# Patient Record
Sex: FEMALE | Race: WHITE | NOT HISPANIC OR LATINO | Employment: FULL TIME | URBAN - METROPOLITAN AREA
[De-identification: names, ages, dates, MRNs, and addresses within clinical notes are randomized per-mention and may not be internally consistent; named-entity substitution may affect disease eponyms.]

---

## 2018-09-03 ENCOUNTER — HISTORICAL (OUTPATIENT)
Dept: URGENT CARE | Facility: CLINIC | Age: 38
End: 2018-09-03

## 2019-12-13 ENCOUNTER — HISTORICAL (OUTPATIENT)
Dept: URGENT CARE | Facility: CLINIC | Age: 39
End: 2019-12-13

## 2022-04-07 ENCOUNTER — HISTORICAL (OUTPATIENT)
Dept: ADMINISTRATIVE | Facility: HOSPITAL | Age: 42
End: 2022-04-07

## 2022-04-24 VITALS
BODY MASS INDEX: 16.9 KG/M2 | WEIGHT: 99 LBS | DIASTOLIC BLOOD PRESSURE: 75 MMHG | HEIGHT: 64 IN | SYSTOLIC BLOOD PRESSURE: 124 MMHG | OXYGEN SATURATION: 100 %

## 2024-01-20 ENCOUNTER — OFFICE VISIT (OUTPATIENT)
Dept: URGENT CARE | Facility: CLINIC | Age: 44
End: 2024-01-20
Payer: COMMERCIAL

## 2024-01-20 VITALS
OXYGEN SATURATION: 98 % | BODY MASS INDEX: 22.2 KG/M2 | RESPIRATION RATE: 18 BRPM | WEIGHT: 130 LBS | HEART RATE: 99 BPM | SYSTOLIC BLOOD PRESSURE: 123 MMHG | TEMPERATURE: 98 F | DIASTOLIC BLOOD PRESSURE: 82 MMHG | HEIGHT: 64 IN

## 2024-01-20 DIAGNOSIS — R05.9 COUGH, UNSPECIFIED TYPE: ICD-10-CM

## 2024-01-20 DIAGNOSIS — J32.9 SINUSITIS, UNSPECIFIED CHRONICITY, UNSPECIFIED LOCATION: Primary | ICD-10-CM

## 2024-01-20 PROCEDURE — 99203 OFFICE O/P NEW LOW 30 MIN: CPT | Mod: ,,,

## 2024-01-20 RX ORDER — GABAPENTIN 100 MG/1
100 CAPSULE ORAL
COMMUNITY
Start: 2024-01-04 | End: 2024-02-03

## 2024-01-20 RX ORDER — DOXYCYCLINE 100 MG/1
100 CAPSULE ORAL EVERY 12 HOURS
Qty: 14 CAPSULE | Refills: 0 | Status: SHIPPED | OUTPATIENT
Start: 2024-01-20 | End: 2024-01-27

## 2024-01-20 NOTE — PROGRESS NOTES
"Subjective:      Patient ID: Karen Harper is a 43 y.o. female.    Vitals:  height is 5' 4" (1.626 m) and weight is 59 kg (130 lb). Her temperature is 98 °F (36.7 °C). Her blood pressure is 123/82 and her pulse is 99. Her respiration is 18 and oxygen saturation is 98%.     Chief Complaint: Cough     Patient is a 43 y.o. female with past medical history of breast cancer, last underwent chemotherapy treatment 1 month ago who presents to urgent care with complaints of  cough and congestion that began 9 days ago.  Patient reports she traveled for the 1st time to [place] since her breast cancer, chemotherapy treatments prior to symptom onset and developed which she believed were allergy symptoms.  Patient reports overall symptoms were improving but over the last 2 days she is returned with more symptoms including nasal congestion, sinus pressure and cough, green sputum production.  Patient denies fever, body aches, chills, neck stiffness, rash, GI symptoms, shortness of breath.  Patient has been is currently here in clinic with 2 day history of similar symptoms and is COVID positive.      ROS   Objective:     Physical Exam   Constitutional: She is oriented to person, place, and time. She appears well-developed. She is cooperative.  Non-toxic appearance. She does not appear ill. No distress.   HENT:   Head: Normocephalic and atraumatic.   Ears:   Right Ear: Hearing, tympanic membrane, external ear and ear canal normal.   Left Ear: Hearing, tympanic membrane, external ear and ear canal normal.   Nose: No mucosal edema, rhinorrhea or nasal deformity. No epistaxis. Right sinus exhibits maxillary sinus tenderness. Right sinus exhibits no frontal sinus tenderness. Left sinus exhibits maxillary sinus tenderness. Left sinus exhibits no frontal sinus tenderness.   Mouth/Throat: Uvula is midline, oropharynx is clear and moist and mucous membranes are normal. Mucous membranes are moist. No trismus in the jaw. Normal dentition. No " uvula swelling. No oropharyngeal exudate, posterior oropharyngeal edema or posterior oropharyngeal erythema.   Eyes: Conjunctivae and lids are normal. No scleral icterus.   Neck: Trachea normal and phonation normal. Neck supple. No edema present. No erythema present. No neck rigidity present.   Cardiovascular: Normal rate, regular rhythm, normal heart sounds and normal pulses.   Pulmonary/Chest: Effort normal and breath sounds normal. No respiratory distress. She has no decreased breath sounds. She has no wheezes. She has no rhonchi. She has no rales.         Comments: Decreased breath sounds    Abdominal: Normal appearance.   Musculoskeletal: Normal range of motion.         General: No deformity. Normal range of motion.   Neurological: She is alert and oriented to person, place, and time. She exhibits normal muscle tone. Coordination normal.   Skin: Skin is warm, dry, intact, not diaphoretic and not pale.   Psychiatric: Her speech is normal and behavior is normal. Judgment and thought content normal.   Nursing note and vitals reviewed.      Assessment:     1. Sinusitis, unspecified chronicity, unspecified location    2. Cough, unspecified type        Plan:       Sinusitis, unspecified chronicity, unspecified location  -     doxycycline (MONODOX) 100 MG capsule; Take 1 capsule (100 mg total) by mouth every 12 (twelve) hours. for 7 days  Dispense: 14 capsule; Refill: 0    Cough, unspecified type           Discussed likely COVID-19 when symptoms began due to symptoms, recent travel.       Lengthy discussion with patient regarding signs of bacterial sinus infection, as symptoms began 9 days ago were improving and if since returned with development of sinus pressure, patient is also immunocompromised due to chemotherapy discussed likely bacterial sinusitis after COVID.  Discussed sending in doxycycline therapy to cover for lungs and sinuses.  Patient reports she would like to give it a few days and if symptoms fail to  improve she will begin antibiotic therapy.  She is following with her provider on Tuesday.  Patient declines any chest x-ray in clinic.      Drink plenty of fluids. Get plenty of rest.   Claritin or Zyrtec 10 mg for nasal congestion.  Nasal spray such as Nasacort or Flonase for congestion.  Warm saltwater gargles for sore throat.   Tylenol as needed for sore throat and fever.   Chloraseptic Spray for worsening sore throat  Call or return to clinic as needed   Go to the ER with any significant change or worsening of symptoms.   Follow up with your primary care doctor.

## 2024-01-20 NOTE — PATIENT INSTRUCTIONS
Antibiotics sent to pharmacy.    Likely COVID when symptoms began however now developing signs symptoms of bacterial sinus infection.  Drink plenty of fluids. Get plenty of rest.   Claritin or Zyrtec 10 mg for nasal congestion.  Nasal spray such as Nasacort or Flonase for congestion.  Warm saltwater gargles for sore throat.   Tylenol as needed for sore throat and fever.   Chloraseptic Spray for worsening sore throat  Call or return to clinic as needed   Go to the ER with any significant change or worsening of symptoms.   Follow up with your primary care doctor.

## 2024-02-28 ENCOUNTER — LAB REQUISITION (OUTPATIENT)
Dept: LAB | Facility: HOSPITAL | Age: 44
End: 2024-02-28
Payer: COMMERCIAL

## 2024-02-28 DIAGNOSIS — L76.82 OTHER POSTPROCEDURAL COMPLICATIONS OF SKIN AND SUBCUTANEOUS TISSUE: ICD-10-CM

## 2024-02-28 PROCEDURE — 87070 CULTURE OTHR SPECIMN AEROBIC: CPT | Performed by: SPECIALIST

## 2024-02-28 PROCEDURE — 87205 SMEAR GRAM STAIN: CPT | Performed by: SPECIALIST

## 2024-02-28 PROCEDURE — 87075 CULTR BACTERIA EXCEPT BLOOD: CPT | Performed by: SPECIALIST

## 2024-02-29 LAB
GRAM STN SPEC: NORMAL
GRAM STN SPEC: NORMAL

## 2024-03-02 LAB
BACTERIA SPEC ANAEROBE CULT: NORMAL
BACTERIA WND CULT: NO GROWTH